# Patient Record
Sex: FEMALE | Race: WHITE | NOT HISPANIC OR LATINO | Employment: OTHER | URBAN - NONMETROPOLITAN AREA
[De-identification: names, ages, dates, MRNs, and addresses within clinical notes are randomized per-mention and may not be internally consistent; named-entity substitution may affect disease eponyms.]

---

## 2017-01-12 ENCOUNTER — OFFICE VISIT (OUTPATIENT)
Dept: GASTROENTEROLOGY | Facility: CLINIC | Age: 27
End: 2017-01-12

## 2017-01-12 VITALS
SYSTOLIC BLOOD PRESSURE: 122 MMHG | HEIGHT: 66 IN | WEIGHT: 162.6 LBS | BODY MASS INDEX: 26.13 KG/M2 | HEART RATE: 86 BPM | DIASTOLIC BLOOD PRESSURE: 80 MMHG | RESPIRATION RATE: 20 BRPM

## 2017-01-12 DIAGNOSIS — K50.00 CROHN'S DISEASE OF SMALL INTESTINE WITHOUT COMPLICATION (HCC): Primary | ICD-10-CM

## 2017-01-12 PROCEDURE — 99212 OFFICE O/P EST SF 10 MIN: CPT | Performed by: CLINICAL NURSE SPECIALIST

## 2017-01-12 RX ORDER — SODIUM, POTASSIUM,MAG SULFATES 17.5-3.13G
SOLUTION, RECONSTITUTED, ORAL ORAL
Qty: 2 BOTTLE | Refills: 0 | Status: SHIPPED | OUTPATIENT
Start: 2017-01-12 | End: 2017-03-28 | Stop reason: HOSPADM

## 2017-01-12 NOTE — LETTER
January 12, 2017     Rafi Barry MD  [address]  [facility] [address]    Patient: Adrianne Mclaughlin   YOB: 1990   Date of Visit: 1/12/2017       Dear Dr. Jhonny MD:    Thank you for referring Adrianne Mclaughlin to me for evaluation. Below are the relevant portions of my assessment and plan of care.    If you have questions, please do not hesitate to call me. I look forward to following Adrianne along with you.         Sincerely,        JENNIFER Lezama        CC: No Recipients  JENNIFER Lezama  1/12/2017  9:36 AM  Signed  Chief Complaint   Patient presents with   • Follow-up     crohns     Subjective   HPI  Adrianne Mclaughlin is a 26 y.o. female who presents with a complaint of Crohns disease that is persistent ongoing chronic for her. Stable with Cimzia. No aggravating or alleviating factor.  She was first diagnosed in 2013 and bx TI showed moderate active inflammation at that time. Next colonoscopy was in 2014 erythematous furrowing granular inflamed and ulcerated CW Crohns.  She has a hx of fistulaizing disease. She does have a spot on her pubic region that does flare and will drain slightly then go away. It is not persistent. It comes and goes over the past year. Drains on occasion like a pimple.  No fever chills or sweats. No diarrhea. No bRBPr. No signs of Crohns flare.     Past Medical History   Diagnosis Date   • Crohn's disease      Past Surgical History   Procedure Laterality Date   • Colonoscopy  02/24/2014     erythematous furrowing granular inflamed and ulcerated mucosa in the TI    • Colonoscopy  01/10/2013     fragments of benight mucosa consistent with TI moderate active infllammation nonspecific     Outpatient Prescriptions Marked as Taking for the 1/12/17 encounter (Office Visit) with JENNIFER Lezama   Medication Sig Dispense Refill   • certolizumab pegol (CIMZIA PREFILLED) 2 X 200 MG/ML kit injection Inject 200 mg under the skin  Every 14 (Fourteen) Days.       No Known Allergies  Social History     Social History   • Marital status:      Spouse name: N/A   • Number of children: N/A   • Years of education: N/A     Occupational History   • Not on file.     Social History Main Topics   • Smoking status: Never Smoker   • Smokeless tobacco: Never Used   • Alcohol use Yes      Comment: occasional   • Drug use: No   • Sexual activity: Yes     Partners: Male     Birth control/ protection: Pill     Other Topics Concern   • Not on file     Social History Narrative     Family History   Problem Relation Age of Onset   • Diabetes Maternal Grandmother    • Heart disease Paternal Grandmother    • Diabetes Paternal Grandfather      Health Maintenance   Topic Date Due   • INFLUENZA VACCINE  08/01/2016     Review of Systems   Constitutional: Negative for activity change, appetite change, chills, diaphoresis, fatigue, fever and unexpected weight change.   HENT: Negative for ear pain, hearing loss, mouth sores, sore throat, trouble swallowing and voice change.    Eyes: Negative.    Respiratory: Negative for cough, choking, shortness of breath and wheezing.    Cardiovascular: Negative for chest pain and palpitations.   Gastrointestinal: Negative for abdominal pain, blood in stool, constipation, diarrhea, nausea and vomiting.        Crohns   Endocrine: Negative for cold intolerance and heat intolerance.   Genitourinary: Negative for decreased urine volume, dysuria, frequency, hematuria and urgency.        Pelvic sore healing   Musculoskeletal: Negative for back pain, gait problem and myalgias.   Skin: Negative for color change, pallor and rash.   Allergic/Immunologic: Negative for food allergies and immunocompromised state.   Neurological: Negative for dizziness, tremors, seizures, syncope, weakness, light-headedness, numbness and headaches.   Hematological: Negative for adenopathy. Does not bruise/bleed easily.   Psychiatric/Behavioral: Negative for  "agitation and confusion. The patient is not nervous/anxious.    All other systems reviewed and are negative.    Objective   Vitals:    01/12/17 0909   BP: 122/80   BP Location: Right arm   Patient Position: Sitting   Cuff Size: Adult   Pulse: 86   Resp: 20   Weight: 162 lb 9.6 oz (73.8 kg)   Height: 66\" (167.6 cm)     Body mass index is 26.24 kg/(m^2).  Physical Exam   Constitutional: She is oriented to person, place, and time. She appears well-developed and well-nourished.   HENT:   Head: Normocephalic and atraumatic.   Eyes: Pupils are equal, round, and reactive to light.   Neck: Normal range of motion. Neck supple. No tracheal deviation present.   Cardiovascular: Normal rate, regular rhythm and normal heart sounds.  Exam reveals no gallop and no friction rub.    No murmur heard.  Pulmonary/Chest: Effort normal and breath sounds normal. No respiratory distress. She has no wheezes. She has no rales. She exhibits no tenderness.   Abdominal: Soft. Bowel sounds are normal. She exhibits no distension. There is no hepatosplenomegaly. There is no tenderness. There is no rigidity, no rebound and no guarding.   Musculoskeletal: Normal range of motion. She exhibits no edema, tenderness or deformity.   Neurological: She is alert and oriented to person, place, and time. She has normal reflexes.   Skin: Skin is warm and dry. No rash noted. No pallor.   Sore in the pelvic region in the pubic hair no drainage does not look like a fistula today. Healing, pink in color.    Psychiatric: She has a normal mood and affect. Her behavior is normal. Judgment and thought content normal.     Assessment/Plan   Adrianne was seen today for follow-up.    Diagnoses and all orders for this visit:    Crohn's disease of small intestine without complication  -     Case Request; Standing  -     Implement Anesthesia Orders Day of Procedure; Standing  -     Obtain Informed Consent; Standing  -     Verify Informed Consent; Standing  -     Verify bowel " prep was successful; Standing  -     Case Request  -     SUPREP BOWEL PREP solution oral solution; Take as directed by office instructions provided    Pelvic region sore looks like ingrown hair I told her if this persists or starts to drain to come in and let us evaluate it.   Continue Cimzia  COLONOSCOPY WITH ANESTHESIA (N/A)  EMR Dragon/transcription disclaimer: Much of this encounter note is electronic transcription/translation of spoken language to printed text. The electronic translation of spoken language may be erroneous, or at times, nonsensical words or phrases may be inadvertently transcribed. Although I have reviewed the note for such errors, some may still exist.  Body mass index is 26.24 kg/(m^2).  Return in about 6 months (around 7/12/2017) for with Jackeline.

## 2017-01-12 NOTE — MR AVS SNAPSHOT
Adrianne Rennercasandra   1/12/2017 9:00 AM   Office Visit    Dept Phone:  [phone]   Encounter #:  [id number]    Provider:  JENNIFER Lezaam   Department:  [facility]                Your Full Care Plan              Today's Medication Changes          These changes are accurate as of: 1/12/17  9:46 AM.  If you have any questions, ask your nurse or doctor.               New Medication(s)Ordered:     SUPREP BOWEL PREP solution oral solution   Generic drug:  sodium-potassium-magnesium sulfates   Take as directed by office instructions provided   Started by:  JENNIFER Lezama         Medication(s)that have changed:     CIMZIA PREFILLED 2 X 200 MG/ML kit injection   Generic drug:  certolizumab pegol   Inject 200 mg under the skin Every 14 (Fourteen) Days.   What changed:  Another medication with the same name was removed. Continue taking this medication, and follow the directions you see here.   Changed by:  JENNIFER Lezama            Where to Get Your Medications      These medications were sent to [facility] [address] [place] [address] [address] AT [facility] [address] - [phone]  - [phone]   [address], [facility] [address]     Phone:  [phone]     SUPREP BOWEL PREP solution oral solution                  Your Updated Medication List          This list is accurate as of: 1/12/17  9:46 AM.  Always use your most recent med list.                CIMZIA PREFILLED 2 X 200 MG/ML kit injection   Generic drug:  certolizumab pegol       FLUVIRIN suspension injection   Generic drug:  Influenza Vac Typ A&B Surf Ant       SUPREP BOWEL PREP solution oral solution   Generic drug:  sodium-potassium-magnesium sulfates   Take as directed by office instructions provided               We Performed the Following     Case Request       You Were Diagnosed With        Codes Comments    Crohn's disease of  "small intestine without complication    -  Primary ICD-10-CM: K50.00  ICD-9-CM: 555.0       Instructions     None    Patient Instructions History      Upcoming Appointments     Visit Type Date Time Department    OFFICE VISIT 2017  9:00 AM [facility]    FOLLOW UP 2017  3:00 PM [facility]      MyChart Signup     [facility] [employer] allows you to send messages to your doctor, view your test results, renew your prescriptions, schedule appointments, and more. To sign up, go to [employer] and click on the Sign Up Now link in the New User? box. Enter your [employer] Activation Code exactly as it appears below along with the last four digits of your Social Security Number and your Date of Birth () to complete the sign-up process. If you do not sign up before the expiration date, you must request a new code.    [employer] Activation Code: [email or web address]  Expires: 2017  9:43 AM    If you have questions, you can email [email or web address] or call [phone] to talk to our [employer] staff. Remember, [employer] is NOT to be used for urgent needs. For medical emergencies, dial 911.               Other Info from Your Visit           Your Appointments     2017  3:00 PM CDT   Follow Up with Venancio Viveros MD   [facility] (--)    [address] [place] [id number] [phone]           Arrive 15 minutes prior to appointment.              Allergies     No Known Allergies      Reason for Visit     Follow-up crohns      Vital Signs     Blood Pressure Pulse Respirations Height Weight Body Mass Index    122/80 (BP Location: Right arm, Patient Position: Sitting, Cuff Size: Adult) 86 20 66\" (167.6 cm) 162 lb 9.6 oz (73.8 kg) 26.24 kg/m2    Smoking Status                   Never Smoker           Problems and Diagnoses Noted     Inflammatory bowel disease (Crohn's disease)        "

## 2017-01-12 NOTE — PROGRESS NOTES
Chief Complaint   Patient presents with   • Follow-up     crohns     Subjective   HPI  Adrianne Mclaughlin is a 26 y.o. female who presents with a complaint of Crohns disease that is persistent ongoing chronic for her. Stable with Cimzia. No aggravating or alleviating factor.  She was first diagnosed in 2013 and bx TI showed moderate active inflammation at that time. Next colonoscopy was in 2014 erythematous furrowing granular inflamed and ulcerated CW Crohns.  She has a hx of fistulaizing disease. She does have a spot on her pubic region that does flare and will drain slightly then go away. It is not persistent. It comes and goes over the past year. Drains on occasion like a pimple.  No fever chills or sweats. No diarrhea. No bRBPr. No signs of Crohns flare.     Past Medical History   Diagnosis Date   • Crohn's disease      Past Surgical History   Procedure Laterality Date   • Colonoscopy  02/24/2014     erythematous furrowing granular inflamed and ulcerated mucosa in the TI    • Colonoscopy  01/10/2013     fragments of benight mucosa consistent with TI moderate active infllammation nonspecific     Outpatient Prescriptions Marked as Taking for the 1/12/17 encounter (Office Visit) with JENNIFER Lezama   Medication Sig Dispense Refill   • certolizumab pegol (CIMZIA PREFILLED) 2 X 200 MG/ML kit injection Inject 200 mg under the skin Every 14 (Fourteen) Days.       No Known Allergies  Social History     Social History   • Marital status:      Spouse name: N/A   • Number of children: N/A   • Years of education: N/A     Occupational History   • Not on file.     Social History Main Topics   • Smoking status: Never Smoker   • Smokeless tobacco: Never Used   • Alcohol use Yes      Comment: occasional   • Drug use: No   • Sexual activity: Yes     Partners: Male     Birth control/ protection: Pill     Other Topics Concern   • Not on file     Social History Narrative     Family History   Problem Relation Age  "of Onset   • Diabetes Maternal Grandmother    • Heart disease Paternal Grandmother    • Diabetes Paternal Grandfather      Health Maintenance   Topic Date Due   • INFLUENZA VACCINE  08/01/2016     Review of Systems   Constitutional: Negative for activity change, appetite change, chills, diaphoresis, fatigue, fever and unexpected weight change.   HENT: Negative for ear pain, hearing loss, mouth sores, sore throat, trouble swallowing and voice change.    Eyes: Negative.    Respiratory: Negative for cough, choking, shortness of breath and wheezing.    Cardiovascular: Negative for chest pain and palpitations.   Gastrointestinal: Negative for abdominal pain, blood in stool, constipation, diarrhea, nausea and vomiting.        Crohns   Endocrine: Negative for cold intolerance and heat intolerance.   Genitourinary: Negative for decreased urine volume, dysuria, frequency, hematuria and urgency.        Pelvic sore healing   Musculoskeletal: Negative for back pain, gait problem and myalgias.   Skin: Negative for color change, pallor and rash.   Allergic/Immunologic: Negative for food allergies and immunocompromised state.   Neurological: Negative for dizziness, tremors, seizures, syncope, weakness, light-headedness, numbness and headaches.   Hematological: Negative for adenopathy. Does not bruise/bleed easily.   Psychiatric/Behavioral: Negative for agitation and confusion. The patient is not nervous/anxious.    All other systems reviewed and are negative.    Objective   Vitals:    01/12/17 0909   BP: 122/80   BP Location: Right arm   Patient Position: Sitting   Cuff Size: Adult   Pulse: 86   Resp: 20   Weight: 162 lb 9.6 oz (73.8 kg)   Height: 66\" (167.6 cm)     Body mass index is 26.24 kg/(m^2).  Physical Exam   Constitutional: She is oriented to person, place, and time. She appears well-developed and well-nourished.   HENT:   Head: Normocephalic and atraumatic.   Eyes: Pupils are equal, round, and reactive to light.   Neck: " Normal range of motion. Neck supple. No tracheal deviation present.   Cardiovascular: Normal rate, regular rhythm and normal heart sounds.  Exam reveals no gallop and no friction rub.    No murmur heard.  Pulmonary/Chest: Effort normal and breath sounds normal. No respiratory distress. She has no wheezes. She has no rales. She exhibits no tenderness.   Abdominal: Soft. Bowel sounds are normal. She exhibits no distension. There is no hepatosplenomegaly. There is no tenderness. There is no rigidity, no rebound and no guarding.   Musculoskeletal: Normal range of motion. She exhibits no edema, tenderness or deformity.   Neurological: She is alert and oriented to person, place, and time. She has normal reflexes.   Skin: Skin is warm and dry. No rash noted. No pallor.   Sore in the pelvic region in the pubic hair no drainage does not look like a fistula today. Healing, pink in color.    Psychiatric: She has a normal mood and affect. Her behavior is normal. Judgment and thought content normal.     Assessment/Plan   Adrianne was seen today for follow-up.    Diagnoses and all orders for this visit:    Crohn's disease of small intestine without complication  -     Case Request; Standing  -     Implement Anesthesia Orders Day of Procedure; Standing  -     Obtain Informed Consent; Standing  -     Verify Informed Consent; Standing  -     Verify bowel prep was successful; Standing  -     Case Request  -     SUPREP BOWEL PREP solution oral solution; Take as directed by office instructions provided    Pelvic region sore looks like ingrown hair I told her if this persists or starts to drain to come in and let us evaluate it.   Continue Cimzia  COLONOSCOPY WITH ANESTHESIA (N/A)  EMR Dragon/transcription disclaimer: Much of this encounter note is electronic transcription/translation of spoken language to printed text. The electronic translation of spoken language may be erroneous, or at times, nonsensical words or phrases may be  inadvertently transcribed. Although I have reviewed the note for such errors, some may still exist.  Body mass index is 26.24 kg/(m^2).  Return in about 6 months (around 7/12/2017) for with Jackeline.

## 2017-02-16 DIAGNOSIS — K50.919 CROHN'S DISEASE WITH COMPLICATION, UNSPECIFIED GASTROINTESTINAL TRACT LOCATION (HCC): Primary | ICD-10-CM

## 2017-03-24 ENCOUNTER — ANESTHESIA EVENT (OUTPATIENT)
Dept: GASTROENTEROLOGY | Facility: HOSPITAL | Age: 27
End: 2017-03-24

## 2017-03-28 ENCOUNTER — HOSPITAL ENCOUNTER (OUTPATIENT)
Facility: HOSPITAL | Age: 27
Setting detail: HOSPITAL OUTPATIENT SURGERY
Discharge: HOME | End: 2017-03-28
Attending: INTERNAL MEDICINE | Admitting: INTERNAL MEDICINE

## 2017-03-28 ENCOUNTER — ANESTHESIA (OUTPATIENT)
Dept: GASTROENTEROLOGY | Facility: HOSPITAL | Age: 27
End: 2017-03-28

## 2017-03-28 VITALS
RESPIRATION RATE: 22 BRPM | HEART RATE: 60 BPM | WEIGHT: 154 LBS | DIASTOLIC BLOOD PRESSURE: 67 MMHG | HEIGHT: 66 IN | TEMPERATURE: 97.3 F | SYSTOLIC BLOOD PRESSURE: 112 MMHG | OXYGEN SATURATION: 100 % | BODY MASS INDEX: 24.75 KG/M2

## 2017-03-28 DIAGNOSIS — K50.00 CROHN'S DISEASE OF SMALL INTESTINE WITHOUT COMPLICATION (HCC): ICD-10-CM

## 2017-03-28 LAB — B-HCG UR QL: NEGATIVE

## 2017-03-28 PROCEDURE — 45378 DIAGNOSTIC COLONOSCOPY: CPT | Performed by: INTERNAL MEDICINE

## 2017-03-28 PROCEDURE — 25010000002 PROPOFOL 10 MG/ML EMULSION: Performed by: NURSE ANESTHETIST, CERTIFIED REGISTERED

## 2017-03-28 PROCEDURE — 81025 URINE PREGNANCY TEST: CPT | Performed by: NURSE ANESTHETIST, CERTIFIED REGISTERED

## 2017-03-28 RX ORDER — PROPOFOL 10 MG/ML
VIAL (ML) INTRAVENOUS AS NEEDED
Status: DISCONTINUED | OUTPATIENT
Start: 2017-03-28 | End: 2017-03-28 | Stop reason: SURG

## 2017-03-28 RX ORDER — SODIUM CHLORIDE 0.9 % (FLUSH) 0.9 %
1-10 SYRINGE (ML) INJECTION AS NEEDED
Status: DISCONTINUED | OUTPATIENT
Start: 2017-03-28 | End: 2017-03-28 | Stop reason: HOSPADM

## 2017-03-28 RX ORDER — LIDOCAINE HYDROCHLORIDE 20 MG/ML
INJECTION, SOLUTION INFILTRATION; PERINEURAL AS NEEDED
Status: DISCONTINUED | OUTPATIENT
Start: 2017-03-28 | End: 2017-03-28 | Stop reason: SURG

## 2017-03-28 RX ORDER — SODIUM CHLORIDE 9 MG/ML
100 INJECTION, SOLUTION INTRAVENOUS CONTINUOUS
Status: DISCONTINUED | OUTPATIENT
Start: 2017-03-28 | End: 2017-03-28 | Stop reason: HOSPADM

## 2017-03-28 RX ADMIN — LIDOCAINE HYDROCHLORIDE 0.5 ML: 10 INJECTION, SOLUTION EPIDURAL; INFILTRATION; INTRACAUDAL; PERINEURAL at 08:00

## 2017-03-28 RX ADMIN — LIDOCAINE HYDROCHLORIDE 50 MG: 20 INJECTION, SOLUTION INFILTRATION; PERINEURAL at 08:50

## 2017-03-28 RX ADMIN — PROPOFOL 200 MG: 10 INJECTION, EMULSION INTRAVENOUS at 08:50

## 2017-03-28 RX ADMIN — SODIUM CHLORIDE 100 ML/HR: 9 INJECTION, SOLUTION INTRAVENOUS at 08:00

## 2017-03-28 NOTE — ANESTHESIA PREPROCEDURE EVALUATION
Anesthesia Evaluation     Patient summary reviewed   NPO Status: > 2 hours   Airway   Mallampati: I  TM distance: >3 FB  Neck ROM: full  no difficulty expected  Dental - normal exam     Pulmonary - negative pulmonary ROS and normal exam   Cardiovascular   Exercise tolerance: excellent (>7 METS)        Neuro/Psych- negative ROS  GI/Hepatic/Renal/Endo - negative ROS     Musculoskeletal (-) negative ROS    Abdominal  - normal exam   Substance History   (+) alcohol use,      OB/GYN negative ob/gyn ROS         Other - negative ROS           Phys Exam Other: Charline drink 0530                            Anesthesia Plan    ASA 1     general     intravenous induction   Anesthetic plan and risks discussed with patient.    Plan discussed with CRNA.

## 2017-03-28 NOTE — PLAN OF CARE
Problem: GI Endoscopy (Adult)  Goal: Signs and Symptoms of Listed Potential Problems Will be Absent or Manageable (GI Endoscopy)  Outcome: Outcome(s) achieved Date Met:  03/28/17

## 2017-03-28 NOTE — PLAN OF CARE
Problem: Patient Care Overview (Adult)  Goal: Plan of Care Review  Outcome: Ongoing (interventions implemented as appropriate)    03/28/17 0903   Coping/Psychosocial Response Interventions   Plan Of Care Reviewed With patient   Patient Care Overview   Progress no change   Outcome Evaluation   Outcome Summary/Follow up Plan pt toleraed procedure well.

## 2017-03-28 NOTE — H&P
"[facility]  Pre Procedure History & Physical    Chief Complaint:   Crohn's    Subjective     HPI:   Here for colonoscopy.  History of Crohn's.  Please see office note dated 1/12/2017    Past Medical History:   Past Medical History:   Diagnosis Date   • Crohn's disease        Past Surgical History:  [unfilled]    Family History:  Family History   Problem Relation Age of Onset   • Diabetes Maternal Grandmother    • Heart disease Paternal Grandmother    • Diabetes Paternal Grandfather        Social History:   reports that she has never smoked. She has never used smokeless tobacco. She reports that she drinks alcohol. She reports that she does not use illicit drugs.    Medications:   Prior to Admission medications    Medication Sig Start Date End Date Taking? Authorizing Provider   certolizumab pegol (CIMZIA PREFILLED) 2 X 200 MG/ML kit injection Inject 200 mg under the skin Every 14 (Fourteen) Days.   Yes Historical Provider, MD   SUPREP BOWEL PREP solution oral solution Take as directed by office instructions provided 1/12/17  Yes JENNIFER Lezama   FLUVIRIN suspension injection ADM 0.5ML IM UTD 10/18/16   Historical Provider, MD       Allergies:  Review of patient's allergies indicates no known allergies.    Objective     Blood pressure 131/69, pulse 70, temperature 97.3 °F (36.3 °C), temperature source Temporal Artery , resp. rate 20, height 66\" (167.6 cm), weight 154 lb (69.9 kg), last menstrual period 03/20/2017, SpO2 100 %.    Physical Exam   Constitutional: Pt is oriented to person, place, and in no distress.   HENT: Mouth/Throat: Oropharynx is clear.   Cardiovascular: Normal rate, regular rhythm.    Pulmonary/Chest: Effort normal. No respiratory distress. No  wheezes.   Abdominal: Soft. Non-distended.  Skin: Skin is warm and dry.   Psychiatric: Mood, memory, affect and judgment appear normal.     Assessment/Plan     Diagnosis:  Crohn's     Anticipated Surgical Procedure:    Proceed with " colonoscopy as scheduled    The risks, benefits, and alternatives of this procedure have been discussed with the patient or the responsible party- the patient understands and agrees to proceed.    EMR Dragon/transcription disclaimer: Much of this encounter note is an electronic transcription/translation of spoken language to printed text.  The electronic translation of spoken language may permit erroneous, or at times, nonsensical words or phrases to be inadvertently transcribed.  Although I have reviewed the note for such errors, some may still exist.    Venancio Viveros MD  8:51 AM  3/28/2017

## 2017-03-28 NOTE — PLAN OF CARE
Problem: Patient Care Overview (Adult)  Goal: Plan of Care Review  Outcome: Outcome(s) achieved Date Met:  03/28/17 03/28/17 0920   Coping/Psychosocial Response Interventions   Plan Of Care Reviewed With patient;spouse   Patient Care Overview   Progress improving   Outcome Evaluation   Outcome Summary/Follow up Plan D/C CRITERIA MET

## 2017-06-27 ENCOUNTER — APPOINTMENT (OUTPATIENT)
Dept: LAB | Facility: HOSPITAL | Age: 27
End: 2017-06-27
Attending: INTERNAL MEDICINE

## 2017-06-27 ENCOUNTER — OFFICE VISIT (OUTPATIENT)
Dept: GASTROENTEROLOGY | Facility: CLINIC | Age: 27
End: 2017-06-27

## 2017-06-27 VITALS
BODY MASS INDEX: 25.07 KG/M2 | SYSTOLIC BLOOD PRESSURE: 120 MMHG | DIASTOLIC BLOOD PRESSURE: 82 MMHG | OXYGEN SATURATION: 99 % | HEART RATE: 88 BPM | HEIGHT: 66 IN | WEIGHT: 156 LBS

## 2017-06-27 DIAGNOSIS — K50.00 CROHN'S DISEASE OF SMALL INTESTINE WITHOUT COMPLICATION (HCC): Primary | ICD-10-CM

## 2017-06-27 LAB
ALBUMIN SERPL-MCNC: 4.4 G/DL (ref 3.5–5)
ALBUMIN/GLOB SERPL: 1.3 G/DL (ref 1.1–2.5)
ALP SERPL-CCNC: 75 U/L (ref 24–120)
ALT SERPL W P-5'-P-CCNC: 29 U/L (ref 0–54)
ANION GAP SERPL CALCULATED.3IONS-SCNC: 10 MMOL/L (ref 4–13)
AST SERPL-CCNC: 21 U/L (ref 7–45)
BILIRUB SERPL-MCNC: 0.7 MG/DL (ref 0.1–1)
BUN BLD-MCNC: 11 MG/DL (ref 5–21)
BUN/CREAT SERPL: 13.1 (ref 7–25)
CALCIUM SPEC-SCNC: 9.4 MG/DL (ref 8.4–10.4)
CHLORIDE SERPL-SCNC: 104 MMOL/L (ref 98–110)
CO2 SERPL-SCNC: 26 MMOL/L (ref 24–31)
CREAT BLD-MCNC: 0.84 MG/DL (ref 0.5–1.4)
CRP SERPL-MCNC: <0.5 MG/DL (ref 0–0.99)
DEPRECATED RDW RBC AUTO: 43.2 FL (ref 40–54)
ERYTHROCYTE [DISTWIDTH] IN BLOOD BY AUTOMATED COUNT: 13.6 % (ref 12–15)
GFR SERPL CREATININE-BSD FRML MDRD: 81 ML/MIN/1.73
GLOBULIN UR ELPH-MCNC: 3.3 GM/DL
GLUCOSE BLD-MCNC: 102 MG/DL (ref 70–100)
HCT VFR BLD AUTO: 40.4 % (ref 37–47)
HGB BLD-MCNC: 13.1 G/DL (ref 12–16)
MCH RBC QN AUTO: 28.4 PG (ref 28–32)
MCHC RBC AUTO-ENTMCNC: 32.4 G/DL (ref 33–36)
MCV RBC AUTO: 87.4 FL (ref 82–98)
PLATELET # BLD AUTO: 259 10*3/MM3 (ref 130–400)
PMV BLD AUTO: 12.1 FL (ref 6–12)
POTASSIUM BLD-SCNC: 4.3 MMOL/L (ref 3.5–5.3)
PROT SERPL-MCNC: 7.7 G/DL (ref 6.3–8.7)
RBC # BLD AUTO: 4.62 10*6/MM3 (ref 4.2–5.4)
SODIUM BLD-SCNC: 140 MMOL/L (ref 135–145)
WBC NRBC COR # BLD: 11.44 10*3/MM3 (ref 4.8–10.8)

## 2017-06-27 PROCEDURE — 86140 C-REACTIVE PROTEIN: CPT | Performed by: INTERNAL MEDICINE

## 2017-06-27 PROCEDURE — 36415 COLL VENOUS BLD VENIPUNCTURE: CPT | Performed by: INTERNAL MEDICINE

## 2017-06-27 PROCEDURE — 80053 COMPREHEN METABOLIC PANEL: CPT | Performed by: INTERNAL MEDICINE

## 2017-06-27 PROCEDURE — 85027 COMPLETE CBC AUTOMATED: CPT | Performed by: INTERNAL MEDICINE

## 2017-06-27 PROCEDURE — 99213 OFFICE O/P EST LOW 20 MIN: CPT | Performed by: INTERNAL MEDICINE

## 2017-06-27 NOTE — PROGRESS NOTES
[facility] Gastroenterology - Office note  6/27/2017    Adrianne Mclaughlin 1990     Chief Complaint   Patient presents with   • GI Problem     Crohns Disease       HISTORY OF PRESENT ILLNESS    Adrianne Mclaughlin is a 26 y.o. female who presents For follow-up of her Crohn's disease.  Actually been having some abdominal pain and distention.  No bleeding.  No fistula.  She was overdue one of her doses of Cimzia because of a miscommunication with the company.  Actually went 6 weeks instead of 4 weeks  between injections.    Last injection 1 week ago     Past Medical History:   Diagnosis Date   • Crohn's disease        Past Surgical History:   Procedure Laterality Date   • COLONOSCOPY  02/24/2014    erythematous furrowing granular inflamed and ulcerated mucosa in the TI    • COLONOSCOPY  01/10/2013    fragments of benight mucosa consistent with TI moderate active infllammation nonspecific   • COLONOSCOPY N/A 3/28/2017    Procedure: COLONOSCOPY WITH ANESTHESIA;  Surgeon: Venancio Viveros MD;  Location: [facility] ENDOSCOPY;  Service:          Current Outpatient Prescriptions:   •  certolizumab pegol (CIMZIA PREFILLED) 2 X 200 MG/ML kit injection, Inject 200 mg under the skin Every 14 (Fourteen) Days., Disp: , Rfl:   •  FLUVIRIN suspension injection, ADM 0.5ML IM UTD, Disp: , Rfl: 0    No Known Allergies    Social History     Social History   • Marital status:      Spouse name: N/A   • Number of children: N/A   • Years of education: N/A     Occupational History   • Not on file.     Social History Main Topics   • Smoking status: Never Smoker   • Smokeless tobacco: Never Used   • Alcohol use Yes      Comment: occasional   • Drug use: No   • Sexual activity: Yes     Partners: Male     Birth control/ protection: Pill     Other Topics Concern   • Not on file     Social History Narrative       Family History   Problem Relation Age of Onset   • Diabetes Maternal Grandmother    • Heart disease Paternal Grandmother   "  • Diabetes Paternal Grandfather    • Colon cancer Neg Hx    • Colon polyps Neg Hx        Review of Systems   Constitutional: Negative.    Respiratory: Negative.    Cardiovascular: Negative.    Gastrointestinal: Positive for abdominal distention, abdominal pain and diarrhea. Negative for blood in stool.       Vitals:    06/27/17 1512   BP: 120/82   Pulse: 88   SpO2: 99%   Weight: 156 lb (70.8 kg)   Height: 66\" (167.6 cm)    Body mass index is 25.18 kg/(m^2).    Physical Exam   Constitutional: She is oriented to person, place, and time. She appears well-developed and well-nourished.   Eyes: Pupils are equal, round, and reactive to light.   Cardiovascular: Normal rate.    Pulmonary/Chest: Effort normal.   Abdominal: Soft. Bowel sounds are normal. She exhibits no distension. There is tenderness. There is no rebound and no guarding.   Neurological: She is alert and oriented to person, place, and time.         ASSESSMENT AND PLAN      1. Crohn's disease of small intestine without complication  We will see how she does after getting back on Cimzia.  Check labs today.  See her back in 6 weeks.  Instructed to call if she has any ongoing problems.  Consider changing to Humira or even one of the injectables such as Entyvio.  Consider CT enterography as well  - CBC (No Diff)  - Comprehensive Metabolic Panel  - C-reactive Protein     EMR Dragon/transcription disclaimer: Much of this encounter note is an electronic transcription/translation of spoken language to printed text.  The electronic translation of spoken language may permit erroneous, or at times, nonsensical words or phrases to be inadvertently transcribed.  Although I have reviewed the note for such errors, some may still exist.    Venancio Viveros MD  6/27/2017  3:27 PM  "

## 2017-08-28 ENCOUNTER — OFFICE VISIT (OUTPATIENT)
Dept: GASTROENTEROLOGY | Facility: CLINIC | Age: 27
End: 2017-08-28

## 2017-08-28 VITALS
WEIGHT: 159 LBS | HEIGHT: 66 IN | BODY MASS INDEX: 25.55 KG/M2 | OXYGEN SATURATION: 100 % | SYSTOLIC BLOOD PRESSURE: 122 MMHG | DIASTOLIC BLOOD PRESSURE: 80 MMHG | HEART RATE: 82 BPM

## 2017-08-28 DIAGNOSIS — K50.00 CROHN'S DISEASE OF SMALL INTESTINE WITHOUT COMPLICATION (HCC): Primary | ICD-10-CM

## 2017-08-28 PROCEDURE — 99212 OFFICE O/P EST SF 10 MIN: CPT | Performed by: INTERNAL MEDICINE

## 2017-08-28 NOTE — PROGRESS NOTES
[facility] - Office note  8/28/2017    Adrianne Mclaughlin 1990     Chief Complaint   Patient presents with   • GI Problem     Here to discuss Crohns Disease       HISTORY OF PRESENT ILLNESS    Adrianne Mclaughlin is a 27 y.o. female who presents For follow-up.  She is back on her Cimzia and seems to be doing better.  Her symptoms have resolved.  No pain.  No bleeding.  No diarrhea.     Past Medical History:   Diagnosis Date   • Crohn's disease        Past Surgical History:   Procedure Laterality Date   • COLONOSCOPY  02/24/2014    erythematous furrowing granular inflamed and ulcerated mucosa in the TI    • COLONOSCOPY  01/10/2013    fragments of benight mucosa consistent with TI moderate active infllammation nonspecific   • COLONOSCOPY N/A 3/28/2017    Procedure: COLONOSCOPY WITH ANESTHESIA;  Surgeon: Veanncio Viveros MD;  Location: [facility] ENDOSCOPY;  Service:          Current Outpatient Prescriptions:   •  certolizumab pegol (CIMZIA PREFILLED) 2 X 200 MG/ML kit injection, Inject 200 mg under the skin Every 14 (Fourteen) Days., Disp: , Rfl:   •  FLUVIRIN suspension injection, ADM 0.5ML IM UTD, Disp: , Rfl: 0    No Known Allergies    Social History     Social History   • Marital status:      Spouse name: N/A   • Number of children: N/A   • Years of education: N/A     Occupational History   • Not on file.     Social History Main Topics   • Smoking status: Never Smoker   • Smokeless tobacco: Never Used   • Alcohol use Yes      Comment: occasional   • Drug use: No   • Sexual activity: Yes     Partners: Male     Birth control/ protection: Pill     Other Topics Concern   • Not on file     Social History Narrative       Family History   Problem Relation Age of Onset   • Diabetes Maternal Grandmother    • Heart disease Paternal Grandmother    • Diabetes Paternal Grandfather    • Colon cancer Neg Hx    • Colon polyps Neg Hx        Review of Systems    Vitals:    08/28/17 1512   BP: 122/80  "  Pulse: 82   SpO2: 100%   Weight: 159 lb (72.1 kg)   Height: 66\" (167.6 cm)    Body mass index is 25.66 kg/(m^2).    Physical Exam      Lab Results - Last 18 Months  Lab Units 06/27/17  1546   WBC 10*3/mm3 11.44*   HEMOGLOBIN g/dL 13.1   HEMATOCRIT % 40.4   MCV fL 87.4   PLATELETS 10*3/mm3 259   GLUCOSE mg/dL 102*   BUN mg/dL 11   CREATININE mg/dL 0.84   SODIUM mmol/L 140   POTASSIUM mmol/L 4.3   CHLORIDE mmol/L 104   CO2 mmol/L 26.0   TOTAL PROTEIN g/dL 7.7   ALBUMIN g/dL 4.40   ALT (SGPT) U/L 29   AST (SGOT) U/L 21   ALK PHOS U/L 75   BILIRUBIN mg/dL 0.7   GLOBULIN gm/dL 3.3   CRP mg/dL <0.50         ASSESSMENT AND PLAN      1. Crohn's disease of small intestine without complication  Symptoms improved on Cimzia.  No need to change her therapy at this time.  We will see her back in 4 months.    EMR Dragon/transcription disclaimer: Much of this encounter note is an electronic transcription/translation of spoken language to printed text.  The electronic translation of spoken language may permit erroneous, or at times, nonsensical words or phrases to be inadvertently transcribed.  Although I have reviewed the note for such errors, some may still exist.    Venancio Viveros MD  8/28/2017  3:31 PM  "

## 2017-11-17 ENCOUNTER — OFFICE VISIT (OUTPATIENT)
Dept: RETAIL CLINIC | Facility: CLINIC | Age: 27
End: 2017-11-17

## 2017-11-17 DIAGNOSIS — Z23 FLU VACCINE NEED: Primary | ICD-10-CM

## 2017-11-18 NOTE — PROGRESS NOTES
Patient presented requesting flu vaccine which was administered per protocol. See [employer] forms.

## 2017-12-19 ENCOUNTER — OFFICE VISIT (OUTPATIENT)
Dept: GASTROENTEROLOGY | Facility: CLINIC | Age: 27
End: 2017-12-19

## 2017-12-19 VITALS
DIASTOLIC BLOOD PRESSURE: 80 MMHG | WEIGHT: 156 LBS | OXYGEN SATURATION: 98 % | BODY MASS INDEX: 25.07 KG/M2 | HEART RATE: 103 BPM | SYSTOLIC BLOOD PRESSURE: 122 MMHG | HEIGHT: 66 IN

## 2017-12-19 DIAGNOSIS — K50.00 CROHN'S DISEASE OF SMALL INTESTINE WITHOUT COMPLICATION (HCC): Primary | ICD-10-CM

## 2017-12-19 PROCEDURE — 99212 OFFICE O/P EST SF 10 MIN: CPT | Performed by: INTERNAL MEDICINE

## 2017-12-19 NOTE — PROGRESS NOTES
[facility] Gastroenterology - Office note  12/19/2017    Adrianne Mclaughlin 1990     Chief Complaint   Patient presents with   • GI Problem     Crohns Disease       HISTORY OF PRESENT ILLNESS    Adrianne Mclaughlin is a 27 y.o. female who presents For follow-up.  Crohn's is stable.  Cimzia has worked well for her.  No complaints of any fistula or perianal disease.  Plans to go to [place] on admission trip in July.  Should not be a problem as long as she is stable.    Past Medical History:   Diagnosis Date   • Crohn's disease        Past Surgical History:   Procedure Laterality Date   • COLONOSCOPY  02/24/2014    erythematous furrowing granular inflamed and ulcerated mucosa in the TI    • COLONOSCOPY  01/10/2013    fragments of benight mucosa consistent with TI moderate active infllammation nonspecific   • COLONOSCOPY N/A 3/28/2017    Procedure: COLONOSCOPY WITH ANESTHESIA;  Surgeon: Venancio Viveros MD;  Location: [facility] ENDOSCOPY;  Service:          Current Outpatient Prescriptions:   •  certolizumab pegol (CIMZIA PREFILLED) 2 X 200 MG/ML kit injection, Inject 200 mg under the skin Every 14 (Fourteen) Days., Disp: , Rfl:     No Known Allergies    Social History     Social History   • Marital status:      Spouse name: N/A   • Number of children: N/A   • Years of education: N/A     Occupational History   • Not on file.     Social History Main Topics   • Smoking status: Never Smoker   • Smokeless tobacco: Never Used   • Alcohol use Yes      Comment: occasional   • Drug use: No   • Sexual activity: Yes     Partners: Male     Birth control/ protection: Pill     Other Topics Concern   • Not on file     Social History Narrative       Family History   Problem Relation Age of Onset   • Diabetes Maternal Grandmother    • Heart disease Paternal Grandmother    • Diabetes Paternal Grandfather    • Colon cancer Neg Hx    • Colon polyps Neg Hx        Review of Systems   Respiratory: Negative.   "  Cardiovascular: Negative.    Gastrointestinal: Negative.        Vitals:    12/19/17 1445   BP: 122/80   Pulse: 103   SpO2: 98%   Weight: 70.8 kg (156 lb)   Height: 167.6 cm (66\")    Body mass index is 25.18 kg/(m^2).    Physical Exam   Constitutional: She appears well-developed and well-nourished.   Abdominal: Soft. Bowel sounds are normal.   Neurological: She is alert.   Skin: Skin is warm and dry.         Lab Results - Last 18 Months  Lab Units 06/27/17  1546   WBC 10*3/mm3 11.44*   HEMOGLOBIN g/dL 13.1   HEMATOCRIT % 40.4   MCV fL 87.4   PLATELETS 10*3/mm3 259   GLUCOSE mg/dL 102*   BUN mg/dL 11   CREATININE mg/dL 0.84   SODIUM mmol/L 140   POTASSIUM mmol/L 4.3   CHLORIDE mmol/L 104   CO2 mmol/L 26.0   TOTAL PROTEIN g/dL 7.7   ALBUMIN g/dL 4.40   ALT (SGPT) U/L 29   AST (SGOT) U/L 21   ALK PHOS U/L 75   BILIRUBIN mg/dL 0.7   GLOBULIN gm/dL 3.3   CRP mg/dL <0.50         ASSESSMENT AND PLAN      1. Crohn's disease of small intestine without complication  We will check labs today and see her back in 6 months before her mission trip    EMR Dragon/transcription disclaimer: Much of this encounter note is an electronic transcription/translation of spoken language to printed text.  The electronic translation of spoken language may permit erroneous, or at times, nonsensical words or phrases to be inadvertently transcribed.  Although I have reviewed the note for such errors, some may still exist.    Venancio Viveros MD  12/19/2017  3:17 PM  "

## 2017-12-28 ENCOUNTER — TELEPHONE (OUTPATIENT)
Dept: GASTROENTEROLOGY | Facility: CLINIC | Age: 27
End: 2017-12-28

## 2018-01-05 ENCOUNTER — TELEPHONE (OUTPATIENT)
Dept: GASTROENTEROLOGY | Facility: CLINIC | Age: 28
End: 2018-01-05

## 2018-01-19 ENCOUNTER — TRANSCRIBE ORDERS (OUTPATIENT)
Dept: ADMINISTRATIVE | Facility: HOSPITAL | Age: 28
End: 2018-01-19

## 2018-01-19 ENCOUNTER — APPOINTMENT (OUTPATIENT)
Dept: LAB | Facility: HOSPITAL | Age: 28
End: 2018-01-19
Attending: INTERNAL MEDICINE

## 2018-01-19 DIAGNOSIS — K50.00 CROHN'S DISEASE OF SMALL INTESTINE WITHOUT COMPLICATION (HCC): Primary | ICD-10-CM

## 2018-01-19 LAB
ALBUMIN SERPL-MCNC: 4.3 G/DL (ref 3.5–5)
ALBUMIN/GLOB SERPL: 1.4 G/DL (ref 1.1–2.5)
ALP SERPL-CCNC: 67 U/L (ref 24–120)
ALT SERPL W P-5'-P-CCNC: 22 U/L (ref 0–54)
ANION GAP SERPL CALCULATED.3IONS-SCNC: 12 MMOL/L (ref 4–13)
AST SERPL-CCNC: 24 U/L (ref 7–45)
BASOPHILS # BLD AUTO: 0.05 10*3/MM3 (ref 0–0.2)
BASOPHILS NFR BLD AUTO: 0.5 % (ref 0–2)
BILIRUB SERPL-MCNC: 0.7 MG/DL (ref 0.1–1)
BUN BLD-MCNC: 11 MG/DL (ref 5–21)
BUN/CREAT SERPL: 13.8 (ref 7–25)
CALCIUM SPEC-SCNC: 9.6 MG/DL (ref 8.4–10.4)
CHLORIDE SERPL-SCNC: 102 MMOL/L (ref 98–110)
CO2 SERPL-SCNC: 29 MMOL/L (ref 24–31)
CREAT BLD-MCNC: 0.8 MG/DL (ref 0.5–1.4)
DEPRECATED RDW RBC AUTO: 43.4 FL (ref 40–54)
EOSINOPHIL # BLD AUTO: 0.13 10*3/MM3 (ref 0–0.7)
EOSINOPHIL NFR BLD AUTO: 1.2 % (ref 0–4)
ERYTHROCYTE [DISTWIDTH] IN BLOOD BY AUTOMATED COUNT: 13.3 % (ref 12–15)
GFR SERPL CREATININE-BSD FRML MDRD: 85 ML/MIN/1.73
GLOBULIN UR ELPH-MCNC: 3.1 GM/DL
GLUCOSE BLD-MCNC: 92 MG/DL (ref 70–100)
HCT VFR BLD AUTO: 39.8 % (ref 37–47)
HGB BLD-MCNC: 12.8 G/DL (ref 12–16)
IMM GRANULOCYTES # BLD: 0.02 10*3/MM3 (ref 0–0.03)
IMM GRANULOCYTES NFR BLD: 0.2 % (ref 0–5)
LYMPHOCYTES # BLD AUTO: 3.03 10*3/MM3 (ref 0.72–4.86)
LYMPHOCYTES NFR BLD AUTO: 28.4 % (ref 15–45)
MCH RBC QN AUTO: 28.4 PG (ref 28–32)
MCHC RBC AUTO-ENTMCNC: 32.2 G/DL (ref 33–36)
MCV RBC AUTO: 88.2 FL (ref 82–98)
MONOCYTES # BLD AUTO: 0.81 10*3/MM3 (ref 0.19–1.3)
MONOCYTES NFR BLD AUTO: 7.6 % (ref 4–12)
NEUTROPHILS # BLD AUTO: 6.63 10*3/MM3 (ref 1.87–8.4)
NEUTROPHILS NFR BLD AUTO: 62.1 % (ref 39–78)
NRBC BLD MANUAL-RTO: 0 /100 WBC (ref 0–0)
PLATELET # BLD AUTO: 282 10*3/MM3 (ref 130–400)
PMV BLD AUTO: 11.7 FL (ref 6–12)
POTASSIUM BLD-SCNC: 3.9 MMOL/L (ref 3.5–5.3)
PROT SERPL-MCNC: 7.4 G/DL (ref 6.3–8.7)
RBC # BLD AUTO: 4.51 10*6/MM3 (ref 4.2–5.4)
SODIUM BLD-SCNC: 143 MMOL/L (ref 135–145)
WBC NRBC COR # BLD: 10.67 10*3/MM3 (ref 4.8–10.8)

## 2018-01-19 PROCEDURE — 80053 COMPREHEN METABOLIC PANEL: CPT | Performed by: INTERNAL MEDICINE

## 2018-01-19 PROCEDURE — 85025 COMPLETE CBC W/AUTO DIFF WBC: CPT | Performed by: INTERNAL MEDICINE

## 2018-01-19 PROCEDURE — 36415 COLL VENOUS BLD VENIPUNCTURE: CPT

## 2018-01-22 ENCOUNTER — OFFICE VISIT (OUTPATIENT)
Dept: RETAIL CLINIC | Facility: CLINIC | Age: 28
End: 2018-01-22

## 2018-01-22 VITALS
SYSTOLIC BLOOD PRESSURE: 98 MMHG | DIASTOLIC BLOOD PRESSURE: 62 MMHG | OXYGEN SATURATION: 98 % | HEART RATE: 78 BPM | TEMPERATURE: 99.5 F | RESPIRATION RATE: 20 BRPM

## 2018-01-22 DIAGNOSIS — J10.1 INFLUENZA A: Primary | ICD-10-CM

## 2018-01-22 LAB
EXPIRATION DATE: ABNORMAL
FLUAV AG NPH QL: POSITIVE
FLUBV AG NPH QL: NEGATIVE
INTERNAL CONTROL: ABNORMAL
Lab: ABNORMAL

## 2018-01-22 PROCEDURE — 87804 INFLUENZA ASSAY W/OPTIC: CPT | Performed by: NURSE PRACTITIONER

## 2018-01-22 PROCEDURE — 99213 OFFICE O/P EST LOW 20 MIN: CPT | Performed by: NURSE PRACTITIONER

## 2018-01-22 RX ORDER — OSELTAMIVIR PHOSPHATE 75 MG/1
75 CAPSULE ORAL
Qty: 10 CAPSULE | Refills: 0 | Status: SHIPPED | OUTPATIENT
Start: 2018-01-22 | End: 2018-01-27

## 2018-01-23 NOTE — PROGRESS NOTES
No chief complaint on file.    Subjective   Adrianne Mclaughlin is a 27 y.o. female who presents to the clinic today with complaints flu symptoms that started Saturday night. She has temperature of 101 yesterday, now low grade. She is take Lizett [place] cold and flu which has helped her HA and helps her sleep.   Influenza   This is a new problem. The current episode started in the past 7 days. The problem occurs constantly. The problem has been gradually worsening. Associated symptoms include arthralgias, chills, congestion, coughing, fatigue, a fever, headaches, myalgias, a sore throat and weakness. Pertinent negatives include no abdominal pain, anorexia, change in bowel habit, chest pain, diaphoresis, joint swelling, nausea, neck pain, numbness, rash, swollen glands, urinary symptoms, vertigo, visual change or vomiting. Nothing aggravates the symptoms. Treatments tried: lizett seltzer cold and flu. The treatment provided mild (helps HA. ) relief.         Current Outpatient Prescriptions:   •  certolizumab pegol (CIMZIA PREFILLED) 2 X 200 MG/ML kit injection, Inject 200 mg under the skin Every 14 (Fourteen) Days., Disp: , Rfl:   •  oseltamivir (TAMIFLU) 75 MG capsule, Take 1 capsule by mouth 2 (Two) Times a Day for 5 days., Disp: 10 capsule, Rfl: 0    Allergies:  Review of patient's allergies indicates no known allergies.    Past Medical History:   Diagnosis Date   • Crohn's disease      Past Surgical History:   Procedure Laterality Date   • COLONOSCOPY  02/24/2014    erythematous furrowing granular inflamed and ulcerated mucosa in the TI    • COLONOSCOPY  01/10/2013    fragments of benight mucosa consistent with TI moderate active infllammation nonspecific   • COLONOSCOPY N/A 3/28/2017    Procedure: COLONOSCOPY WITH ANESTHESIA;  Surgeon: Venancio Viveros MD;  Location: [facility] ENDOSCOPY;  Service:      Family History   Problem Relation Age of Onset   • Diabetes Maternal Grandmother    • Heart disease Paternal  Grandmother    • Diabetes Paternal Grandfather    • Colon cancer Neg Hx    • Colon polyps Neg Hx      Social History   Substance Use Topics   • Smoking status: Never Smoker   • Smokeless tobacco: Never Used   • Alcohol use Yes      Comment: occasional       Review of Systems  Review of Systems   Constitutional: Positive for chills, fatigue and fever. Negative for diaphoresis.   HENT: Positive for congestion and sore throat.    Respiratory: Positive for cough.    Cardiovascular: Negative for chest pain.   Gastrointestinal: Negative for abdominal pain, anorexia, change in bowel habit, nausea and vomiting.   Musculoskeletal: Positive for arthralgias and myalgias. Negative for joint swelling and neck pain.   Skin: Negative for rash.   Neurological: Positive for weakness and headaches. Negative for vertigo and numbness.       Objective   BP 98/62 (BP Location: Left arm, Patient Position: Sitting, Cuff Size: Adult)  Pulse 78  Temp 99.5 °F (37.5 °C) (Tympanic)   Resp 20  LMP 12/18/2017 (Approximate)  SpO2 98%  Breastfeeding? No      Physical Exam   Constitutional: She is oriented to person, place, and time. She appears well-developed and well-nourished.   HENT:   Head: Normocephalic and atraumatic.   Right Ear: Hearing, external ear and ear canal normal. Tympanic membrane is bulging.   Left Ear: Hearing, external ear and ear canal normal. Tympanic membrane is bulging.   Nose: Rhinorrhea present. Right sinus exhibits no maxillary sinus tenderness and no frontal sinus tenderness. Left sinus exhibits no maxillary sinus tenderness and no frontal sinus tenderness.   Mouth/Throat: Uvula is midline. Mucous membranes are dry. Posterior oropharyngeal erythema present. No oropharyngeal exudate, posterior oropharyngeal edema or tonsillar abscesses. Tonsils are 1+ on the right. Tonsils are 1+ on the left. No tonsillar exudate.   Eyes: Pupils are equal, round, and reactive to light.   Neck: Normal range of motion. Neck supple.    Cardiovascular: Normal rate, regular rhythm and normal heart sounds.  Exam reveals no gallop and no friction rub.    No murmur heard.  Pulmonary/Chest: Effort normal and breath sounds normal. No stridor. No respiratory distress. She has no wheezes. She has no rales. She exhibits no tenderness.   Lymphadenopathy:     She has no cervical adenopathy.   Neurological: She is alert and oriented to person, place, and time.   Skin: Skin is warm and dry.   Psychiatric: She has a normal mood and affect. Her behavior is normal.   Vitals reviewed.      Assessment/Plan     Diagnoses and all orders for this visit:    Influenza A  -     POCT Influenza A/B    Other orders  -     oseltamivir (TAMIFLU) 75 MG capsule; Take 1 capsule by mouth 2 (Two) Times a Day for 5 days.    Drink plenty of fluids and rest.   Follow up if symptoms worsen or persist.   Treat fever if over 101 and needs to keep fever < 101.

## 2018-01-23 NOTE — PATIENT INSTRUCTIONS

## 2018-01-30 ENCOUNTER — TELEPHONE (OUTPATIENT)
Dept: GASTROENTEROLOGY | Facility: CLINIC | Age: 28
End: 2018-01-30

## 2018-06-25 ENCOUNTER — HOSPITAL ENCOUNTER (OUTPATIENT)
Dept: GENERAL RADIOLOGY | Facility: HOSPITAL | Age: 28
Discharge: HOME | End: 2018-06-25
Admitting: CLINICAL NURSE SPECIALIST

## 2018-06-25 ENCOUNTER — OFFICE VISIT (OUTPATIENT)
Dept: GASTROENTEROLOGY | Facility: CLINIC | Age: 28
End: 2018-06-25

## 2018-06-25 ENCOUNTER — APPOINTMENT (OUTPATIENT)
Dept: LAB | Facility: HOSPITAL | Age: 28
End: 2018-06-25

## 2018-06-25 VITALS
SYSTOLIC BLOOD PRESSURE: 122 MMHG | HEIGHT: 66 IN | BODY MASS INDEX: 23.78 KG/M2 | HEART RATE: 77 BPM | DIASTOLIC BLOOD PRESSURE: 80 MMHG | WEIGHT: 148 LBS | OXYGEN SATURATION: 98 %

## 2018-06-25 DIAGNOSIS — K50.00 CROHN'S DISEASE OF SMALL INTESTINE WITHOUT COMPLICATION (HCC): ICD-10-CM

## 2018-06-25 DIAGNOSIS — K50.00 CROHN'S DISEASE OF SMALL INTESTINE WITHOUT COMPLICATION (HCC): Primary | ICD-10-CM

## 2018-06-25 PROCEDURE — 71046 X-RAY EXAM CHEST 2 VIEWS: CPT

## 2018-06-25 PROCEDURE — 99213 OFFICE O/P EST LOW 20 MIN: CPT | Performed by: INTERNAL MEDICINE

## 2018-06-25 NOTE — PROGRESS NOTES
Adrianne Mclaughlin  1990 6/25/2018  Chief Complaint   Patient presents with   • GI Problem     Crohns Disease         HPI    Adrianne Mclaughlin is a  27 y.o. female here for a follow up visit for complaint of Crohn's disease course constant ongoing. She is on Cimzia which has worked well for her. No complaints of signs of flare. No diarrhea. No BRBPR. No drainage or signs of pain. No signs of recurrent fistula. No upper GI complaints. No wt loss.     Past Medical History:   Diagnosis Date   • Crohn's disease      Past Surgical History:   Procedure Laterality Date   • COLONOSCOPY  02/24/2014    erythematous furrowing granular inflamed and ulcerated mucosa in the TI    • COLONOSCOPY  01/10/2013    fragments of benight mucosa consistent with TI moderate active infllammation nonspecific   • COLONOSCOPY N/A 3/28/2017    Stricture in the terminal ileum, Crohns disease with ileitis       Outpatient Prescriptions Marked as Taking for the 6/25/18 encounter (Office Visit) with Venancio Viveros MD   Medication Sig Dispense Refill   • certolizumab pegol (CIMZIA PREFILLED) 2 X 200 MG/ML kit injection Inject 200 mg under the skin Every 14 (Fourteen) Days.         No Known Allergies    Social History     Social History   • Marital status:      Spouse name: N/A   • Number of children: N/A   • Years of education: N/A     Occupational History   • Not on file.     Social History Main Topics   • Smoking status: Never Smoker   • Smokeless tobacco: Never Used   • Alcohol use Yes      Comment: occasional   • Drug use: No   • Sexual activity: Yes     Partners: Male     Birth control/ protection: Condom     Other Topics Concern   • Not on file     Social History Narrative   • No narrative on file       Family History   Problem Relation Age of Onset   • Diabetes Maternal Grandmother    • Heart disease Paternal Grandmother    • Diabetes Paternal Grandfather    • Colon cancer Neg Hx    • Colon polyps Neg Hx        Review of  "Systems   Constitutional: Negative for activity change, appetite change, chills, diaphoresis, fatigue, fever and unexpected weight change.   HENT: Negative for ear pain, hearing loss, mouth sores, sore throat, trouble swallowing and voice change.    Eyes: Negative.    Respiratory: Negative for cough, choking, shortness of breath and wheezing.    Cardiovascular: Negative for chest pain and palpitations.   Gastrointestinal: Negative for abdominal pain, blood in stool, constipation, diarrhea, nausea and vomiting.   Endocrine: Negative for cold intolerance and heat intolerance.   Genitourinary: Negative for decreased urine volume, dysuria, frequency, hematuria and urgency.   Musculoskeletal: Negative for back pain, gait problem and myalgias.   Skin: Negative for color change, pallor and rash.   Allergic/Immunologic: Negative for food allergies and immunocompromised state.   Neurological: Negative for dizziness, tremors, seizures, syncope, weakness, light-headedness, numbness and headaches.   Hematological: Negative for adenopathy. Does not bruise/bleed easily.   Psychiatric/Behavioral: Negative for agitation and confusion. The patient is not nervous/anxious.    All other systems reviewed and are negative.      /80   Pulse 77   Ht 167.6 cm (66\")   Wt 67.1 kg (148 lb)   SpO2 98%   BMI 23.89 kg/m²   Body mass index is 23.89 kg/m².    Patient's Body mass index is 23.89 kg/m². BMI is within normal parameters. No follow-up required.  Physical Exam   Constitutional: She is oriented to person, place, and time. She appears well-developed and well-nourished.   HENT:   Head: Normocephalic and atraumatic.   Eyes: Pupils are equal, round, and reactive to light.   Neck: Normal range of motion. Neck supple. No tracheal deviation present.   Cardiovascular: Normal rate, regular rhythm and normal heart sounds.  Exam reveals no gallop and no friction rub.    No murmur heard.  Pulmonary/Chest: Effort normal and breath sounds " normal. No respiratory distress. She has no wheezes. She has no rales. She exhibits no tenderness.   Abdominal: Soft. Bowel sounds are normal. She exhibits no distension. There is no hepatosplenomegaly. There is no tenderness. There is no rigidity, no rebound and no guarding.   Musculoskeletal: Normal range of motion. She exhibits no edema, tenderness or deformity.   Neurological: She is alert and oriented to person, place, and time. She has normal reflexes.   Skin: Skin is warm and dry. No rash noted. No pallor.   Psychiatric: She has a normal mood and affect. Her behavior is normal. Judgment and thought content normal.       ASSESSMENT AND PLAN    Adrianne was seen today for gi problem.    Diagnoses and all orders for this visit:    Crohn's disease of small intestine without complication  Comments:  cont ongoing active therapy  Orders:  -     QuantiFERON TB Gold  -     XR Chest 2 View; Future  -     Hepatitis B Surface Antibody  -     Hepatitis B Core Antibody, IgM      Due for colon next year. To continue ongoing active therapy she is stable at this time.     There are no Patient Instructions on file for this visit.  Emma Mireles, APRN  3:32 PM  6/25/2018    Obesity, Adult  Obesity is the condition of having too much total body fat. Being overweight or obese means that your weight is greater than what is considered healthy for your body size. Obesity is determined by a measurement called BMI. BMI is an estimate of body fat and is calculated from height and weight. For adults, a BMI of 30 or higher is considered obese.  Obesity can eventually lead to other health concerns and major illnesses, including:  · Stroke.  · Coronary artery disease (CAD).  · Type 2 diabetes.  · Some types of cancer, including cancers of the colon, breast, uterus, and gallbladder.  · Osteoarthritis.  · High blood pressure (hypertension).  · High cholesterol.  · Sleep apnea.  · Gallbladder stones.  · Infertility problems.  What are  the causes?  The main cause of obesity is taking in (consuming) more calories than your body uses for energy. Other factors that contribute to this condition may include:  · Being born with genes that make you more likely to become obese.  · Having a medical condition that causes obesity. These conditions include:  ¨ Hypothyroidism.  ¨ Polycystic ovarian syndrome (PCOS).  ¨ Binge-eating disorder.  ¨ Cushing syndrome.  · Taking certain medicines, such as steroids, antidepressants, and seizure medicines.  · Not being physically active (sedentary lifestyle).  · Living where there are limited places to exercise safely or buy healthy foods.  · Not getting enough sleep.  What increases the risk?  The following factors may increase your risk of this condition:  · Having a family history of obesity.  · Being a woman of -American descent.  · Being a man of  descent.  What are the signs or symptoms?  Having excessive body fat is the main symptom of this condition.  How is this diagnosed?  This condition may be diagnosed based on:  · Your symptoms.  · Your medical history.  · A physical exam. Your health care provider may measure:  ¨ Your BMI. If you are an adult with a BMI between 25 and less than 30, you are considered overweight. If you are an adult with a BMI of 30 or higher, you are considered obese.  ¨ The distances around your hips and your waist (circumferences). These may be compared to each other to help diagnose your condition.  ¨ Your skinfold thickness. Your health care provider may gently pinch a fold of your skin and measure it.  How is this treated?  Treatment for this condition often includes changing your lifestyle. Treatment may include some or all of the following:  · Dietary changes. Work with your health care provider and a dietitian to set a weight-loss goal that is healthy and reasonable for you. Dietary changes may include eating:  ¨ Smaller portions. A portion size is the amount of a  particular food that is healthy for you to eat at one time. This varies from person to person.  ¨ Low-calorie or low-fat options.  ¨ More whole grains, fruits, and vegetables.  · Regular physical activity. This may include aerobic activity (cardio) and strength training.  · Medicine to help you lose weight. Your health care provider may prescribe medicine if you are unable to lose 1 pound a week after 6 weeks of eating more healthily and doing more physical activity.  · Surgery. Surgical options may include gastric banding and gastric bypass. Surgery may be done if:  ¨ Other treatments have not helped to improve your condition.  ¨ You have a BMI of 40 or higher.  ¨ You have life-threatening health problems related to obesity.  Follow these instructions at home:     Eating and drinking     · Follow recommendations from your health care provider about what you eat and drink. Your health care provider may advise you to:  ¨ Limit fast foods, sweets, and processed snack foods.  ¨ Choose low-fat options, such as low-fat milk instead of whole milk.  ¨ Eat 5 or more servings of fruits or vegetables every day.  ¨ Eat at home more often. This gives you more control over what you eat.  ¨ Choose healthy foods when you eat out.  ¨ Learn what a healthy portion size is.  ¨ Keep low-fat snacks on hand.  ¨ Avoid sugary drinks, such as soda, fruit juice, iced tea sweetened with sugar, and flavored milk.  ¨ Eat a healthy breakfast.  · Drink enough water to keep your urine clear or pale yellow.  · Do not go without eating for long periods of time (do not fast) or follow a fad diet. Fasting and fad diets can be unhealthy and even dangerous.  Physical Activity   · Exercise regularly, as told by your health care provider. Ask your health care provider what types of exercise are safe for you and how often you should exercise.  · Warm up and stretch before being active.  · Cool down and stretch after being active.  · Rest between periods of  activity.  Lifestyle   · Limit the time that you spend in front of your TV, computer, or video game system.  · Find ways to reward yourself that do not involve food.  · Limit alcohol intake to no more than 1 drink a day for nonpregnant women and 2 drinks a day for men. One drink equals 12 oz of beer, 5 oz of wine, or 1½ oz of hard liquor.  General instructions   · Keep a weight loss journal to keep track of the food you eat and how much you exercise you get.  · Take over-the-counter and prescription medicines only as told by your health care provider.  · Take vitamins and supplements only as told by your health care provider.  · Consider joining a support group. Your health care provider may be able to recommend a support group.  · Keep all follow-up visits as told by your health care provider. This is important.  Contact a health care provider if:  · You are unable to meet your weight loss goal after 6 weeks of dietary and lifestyle changes.  This information is not intended to replace advice given to you by your health care provider. Make sure you discuss any questions you have with your health care provider.  Document Released: 01/25/2006 Document Revised: 05/22/2017 Document Reviewed: 10/05/2016  [employer] Interactive Patient Education © 2017 [employer]      IF YOU SMOKE OR USE TOBACCO PLEASE READ THE FOLLOWING:    Why is smoking bad for me?  Smoking increases the risk of heart disease, lung disease, vascular disease, stroke, and cancer.     If you smoke, STOP!    If you would like more information on quitting smoking, please visit the [employer] website: [email or web address]/healthier-[email or web address]   This link will provide additional resources including the QUIT line and the Beat the Pack support groups.     For more information:    Quit Now [place]  1-800-QUIT-NOW  https://[place].quitlogix.org/en-[place]/

## 2018-06-26 ENCOUNTER — LAB (OUTPATIENT)
Dept: LAB | Facility: HOSPITAL | Age: 28
End: 2018-06-26

## 2018-06-26 DIAGNOSIS — K50.00 CROHN'S DISEASE OF SMALL INTESTINE WITHOUT COMPLICATION (HCC): Primary | ICD-10-CM

## 2018-06-26 LAB
HBV CORE IGM SERPL QL IA: NEGATIVE
HBV SURFACE AB SER QL: 7.9
HBV SURFACE AB SER RIA-ACNC: NORMAL

## 2018-06-26 PROCEDURE — 36415 COLL VENOUS BLD VENIPUNCTURE: CPT

## 2018-06-26 PROCEDURE — 86706 HEP B SURFACE ANTIBODY: CPT | Performed by: CLINICAL NURSE SPECIALIST

## 2018-06-26 PROCEDURE — 86705 HEP B CORE ANTIBODY IGM: CPT | Performed by: CLINICAL NURSE SPECIALIST

## 2018-06-26 PROCEDURE — 86480 TB TEST CELL IMMUN MEASURE: CPT | Performed by: CLINICAL NURSE SPECIALIST

## 2018-07-03 LAB
INTERPRETATION: NORMAL
M TB TUBERC IFN-G BLD QL: NEGATIVE
QFT TB AG MINUS NIL VALUE: 0.01 IU/ML
QUANTIFERON CRITERIA: NORMAL
QUANTIFERON MITOGEN VALUE: 6.84 IU/ML
QUANTIFERON NIL VALUE: 0.04 IU/ML
QUANTIFERON TB AG VALUE: 0.05 IU/ML

## 2018-09-24 ENCOUNTER — OFFICE VISIT (OUTPATIENT)
Dept: GASTROENTEROLOGY | Facility: CLINIC | Age: 28
End: 2018-09-24

## 2018-09-24 VITALS
WEIGHT: 155 LBS | BODY MASS INDEX: 24.91 KG/M2 | DIASTOLIC BLOOD PRESSURE: 80 MMHG | HEIGHT: 66 IN | OXYGEN SATURATION: 99 % | SYSTOLIC BLOOD PRESSURE: 122 MMHG | HEART RATE: 105 BPM

## 2018-09-24 DIAGNOSIS — K50.00 CROHN'S DISEASE OF SMALL INTESTINE WITHOUT COMPLICATION (HCC): Primary | ICD-10-CM

## 2018-09-24 PROCEDURE — 99213 OFFICE O/P EST LOW 20 MIN: CPT | Performed by: INTERNAL MEDICINE

## 2018-09-24 NOTE — PROGRESS NOTES
Adrianne Mclaughlin  1990 9/24/2018  Chief Complaint   Patient presents with   • GI Problem     Crohns Disease         HPI    Adrianne Mclaughlin is a  28 y.o. female here for a follow up visit for Crohn's disease course constant onging. She has been on Cimzia and this worked well for her. The cost was a concern. She is switching to Humira on October 3rd. She has no signs of flare. No associated symptoms. No diarrhea or BRBPR.     Past Medical History:   Diagnosis Date   • Crohn's disease (CMS/HCC)      Past Surgical History:   Procedure Laterality Date   • COLONOSCOPY  02/24/2014    erythematous furrowing granular inflamed and ulcerated mucosa in the TI    • COLONOSCOPY  01/10/2013    fragments of benight mucosa consistent with TI moderate active infllammation nonspecific   • COLONOSCOPY N/A 3/28/2017    Stricture in the terminal ileum, Crohns disease with ileitis       No outpatient prescriptions have been marked as taking for the 9/24/18 encounter (Office Visit) with Venancio Viveros MD.       No Known Allergies    Social History     Social History   • Marital status:      Spouse name: N/A   • Number of children: N/A   • Years of education: N/A     Occupational History   • Not on file.     Social History Main Topics   • Smoking status: Never Smoker   • Smokeless tobacco: Never Used   • Alcohol use Yes      Comment: occasional   • Drug use: No   • Sexual activity: Yes     Partners: Male     Birth control/ protection: Condom     Other Topics Concern   • Not on file     Social History Narrative   • No narrative on file       Family History   Problem Relation Age of Onset   • Diabetes Maternal Grandmother    • Heart disease Paternal Grandmother    • Diabetes Paternal Grandfather    • Colon cancer Neg Hx    • Colon polyps Neg Hx        Review of Systems   Constitutional: Negative for activity change, appetite change, chills, diaphoresis, fatigue, fever and unexpected weight change.   HENT: Negative  "for ear pain, hearing loss, mouth sores, sore throat, trouble swallowing and voice change.    Eyes: Negative.    Respiratory: Negative for cough, choking, shortness of breath and wheezing.    Cardiovascular: Negative for chest pain and palpitations.   Gastrointestinal: Negative for abdominal pain, blood in stool, constipation, diarrhea, nausea and vomiting.   Endocrine: Negative for cold intolerance and heat intolerance.   Genitourinary: Negative for decreased urine volume, dysuria, frequency, hematuria and urgency.   Musculoskeletal: Negative for back pain, gait problem and myalgias.   Skin: Negative for color change, pallor and rash.   Allergic/Immunologic: Negative for food allergies and immunocompromised state.   Neurological: Negative for dizziness, tremors, seizures, syncope, weakness, light-headedness, numbness and headaches.   Hematological: Negative for adenopathy. Does not bruise/bleed easily.   Psychiatric/Behavioral: Negative for agitation and confusion. The patient is not nervous/anxious.    All other systems reviewed and are negative.      /80   Pulse 105   Ht 167.6 cm (66\")   Wt 70.3 kg (155 lb)   SpO2 99%   BMI 25.02 kg/m²   Body mass index is 25.02 kg/m².    Physical Exam   Constitutional: She is oriented to person, place, and time. She appears well-developed and well-nourished.   HENT:   Head: Normocephalic and atraumatic.   Eyes: Pupils are equal, round, and reactive to light.   Neck: Normal range of motion. Neck supple. No tracheal deviation present.   Cardiovascular: Normal rate, regular rhythm and normal heart sounds.  Exam reveals no gallop and no friction rub.    No murmur heard.  Pulmonary/Chest: Effort normal and breath sounds normal. No respiratory distress. She has no wheezes. She has no rales. She exhibits no tenderness.   Abdominal: Soft. Bowel sounds are normal. She exhibits no distension. There is no hepatosplenomegaly. There is no tenderness. There is no rigidity, no " rebound and no guarding.   Musculoskeletal: Normal range of motion. She exhibits no edema, tenderness or deformity.   Neurological: She is alert and oriented to person, place, and time. She has normal reflexes.   Skin: Skin is warm and dry. No rash noted. No pallor.   Psychiatric: She has a normal mood and affect. Her behavior is normal. Judgment and thought content normal.       ASSESSMENT AND PLAN    Adrianne was seen today for gi problem.    Diagnoses and all orders for this visit:    Crohn's disease of small intestine without complication (CMS/HCC)    cont ongoing active therapy. Humira to start October 3rd. She is aware of loading dose. She is moving to the [facility] area and she is aware to find a PCP and Gastroenterologist there.       There are no Patient Instructions on file for this visit.  JENNIFER Lezama  2:22 PM  9/24/2018      I performed a history, physical examination, reviewed the progress note/consult note, and discussed the management of this patient with JENNIFER Resendiz as her supervising physician.  I agree with the documented findings and plan of care as outlined with any exceptions or new recommendations noted below.    Patient advised that she needs to locate a gastroenterologist in [place] after she leaves here.  The available see her as needed.  Will start Humira.  She will come by the office for instruction if she is uncomfortable with the new injectors.    EMR Dragon/transcription disclaimer: Much of this encounter note is an electronic transcription/translation of spoken language to printed text.  The electronic translation of spoken language may permit erroneous, or at times, nonsensical words or phrases to be inadvertently transcribed.  Although I have reviewed the note for such errors, some may still exist.      Venancio Viveros MD  2:23 PM  9/24/2018    Obesity, Adult  Obesity is the condition of having too much total body fat. Being overweight or obese means that your  weight is greater than what is considered healthy for your body size. Obesity is determined by a measurement called BMI. BMI is an estimate of body fat and is calculated from height and weight. For adults, a BMI of 30 or higher is considered obese.  Obesity can eventually lead to other health concerns and major illnesses, including:  · Stroke.  · Coronary artery disease (CAD).  · Type 2 diabetes.  · Some types of cancer, including cancers of the colon, breast, uterus, and gallbladder.  · Osteoarthritis.  · High blood pressure (hypertension).  · High cholesterol.  · Sleep apnea.  · Gallbladder stones.  · Infertility problems.  What are the causes?  The main cause of obesity is taking in (consuming) more calories than your body uses for energy. Other factors that contribute to this condition may include:  · Being born with genes that make you more likely to become obese.  · Having a medical condition that causes obesity. These conditions include:  ¨ Hypothyroidism.  ¨ Polycystic ovarian syndrome (PCOS).  ¨ Binge-eating disorder.  ¨ Cushing syndrome.  · Taking certain medicines, such as steroids, antidepressants, and seizure medicines.  · Not being physically active (sedentary lifestyle).  · Living where there are limited places to exercise safely or buy healthy foods.  · Not getting enough sleep.  What increases the risk?  The following factors may increase your risk of this condition:  · Having a family history of obesity.  · Being a woman of -American descent.  · Being a man of  descent.  What are the signs or symptoms?  Having excessive body fat is the main symptom of this condition.  How is this diagnosed?  This condition may be diagnosed based on:  · Your symptoms.  · Your medical history.  · A physical exam. Your health care provider may measure:  ¨ Your BMI. If you are an adult with a BMI between 25 and less than 30, you are considered overweight. If you are an adult with a BMI of 30 or higher,  you are considered obese.  ¨ The distances around your hips and your waist (circumferences). These may be compared to each other to help diagnose your condition.  ¨ Your skinfold thickness. Your health care provider may gently pinch a fold of your skin and measure it.  How is this treated?  Treatment for this condition often includes changing your lifestyle. Treatment may include some or all of the following:  · Dietary changes. Work with your health care provider and a dietitian to set a weight-loss goal that is healthy and reasonable for you. Dietary changes may include eating:  ¨ Smaller portions. A portion size is the amount of a particular food that is healthy for you to eat at one time. This varies from person to person.  ¨ Low-calorie or low-fat options.  ¨ More whole grains, fruits, and vegetables.  · Regular physical activity. This may include aerobic activity (cardio) and strength training.  · Medicine to help you lose weight. Your health care provider may prescribe medicine if you are unable to lose 1 pound a week after 6 weeks of eating more healthily and doing more physical activity.  · Surgery. Surgical options may include gastric banding and gastric bypass. Surgery may be done if:  ¨ Other treatments have not helped to improve your condition.  ¨ You have a BMI of 40 or higher.  ¨ You have life-threatening health problems related to obesity.  Follow these instructions at home:     Eating and drinking     · Follow recommendations from your health care provider about what you eat and drink. Your health care provider may advise you to:  ¨ Limit fast foods, sweets, and processed snack foods.  ¨ Choose low-fat options, such as low-fat milk instead of whole milk.  ¨ Eat 5 or more servings of fruits or vegetables every day.  ¨ Eat at home more often. This gives you more control over what you eat.  ¨ Choose healthy foods when you eat out.  ¨ Learn what a healthy portion size is.  ¨ Keep low-fat snacks on  hand.  ¨ Avoid sugary drinks, such as soda, fruit juice, iced tea sweetened with sugar, and flavored milk.  ¨ Eat a healthy breakfast.  · Drink enough water to keep your urine clear or pale yellow.  · Do not go without eating for long periods of time (do not fast) or follow a fad diet. Fasting and fad diets can be unhealthy and even dangerous.  Physical Activity   · Exercise regularly, as told by your health care provider. Ask your health care provider what types of exercise are safe for you and how often you should exercise.  · Warm up and stretch before being active.  · Cool down and stretch after being active.  · Rest between periods of activity.  Lifestyle   · Limit the time that you spend in front of your TV, computer, or video game system.  · Find ways to reward yourself that do not involve food.  · Limit alcohol intake to no more than 1 drink a day for nonpregnant women and 2 drinks a day for men. One drink equals 12 oz of beer, 5 oz of wine, or 1½ oz of hard liquor.  General instructions   · Keep a weight loss journal to keep track of the food you eat and how much you exercise you get.  · Take over-the-counter and prescription medicines only as told by your health care provider.  · Take vitamins and supplements only as told by your health care provider.  · Consider joining a support group. Your health care provider may be able to recommend a support group.  · Keep all follow-up visits as told by your health care provider. This is important.  Contact a health care provider if:  · You are unable to meet your weight loss goal after 6 weeks of dietary and lifestyle changes.  This information is not intended to replace advice given to you by your health care provider. Make sure you discuss any questions you have with your health care provider.  Document Released: 01/25/2006 Document Revised: 05/22/2017 Document Reviewed: 10/05/2016  [employer] Interactive Patient Education © 2017 [employer]      IF YOU SMOKE OR  USE TOBACCO PLEASE READ THE FOLLOWING:    Why is smoking bad for me?  Smoking increases the risk of heart disease, lung disease, vascular disease, stroke, and cancer.     If you smoke, STOP!    If you would like more information on quitting smoking, please visit the [employer] website: www.[employer]/healthier-together/smoke   This link will provide additional resources including the QUIT line and the Beat the Pack support groups.     For more information:    Quit Now [facility]  1-800-QUIT-NOW  https://[place].quitlogix.org/en-US/

## (undated) DEVICE — Device: Brand: DEFENDO AIR/WATER/SUCTION AND BIOPSY VALVE

## (undated) DEVICE — TBG SMPL FLTR LINE NASL 02/C02 A/ BX/100

## (undated) DEVICE — ENDOGATOR AUXILIARY WATER JET CONNECTOR: Brand: ENDOGATOR

## (undated) DEVICE — MSK O2 MD CONCENTR A/ LF 7FT 1P/U

## (undated) DEVICE — SENSR O2 OXIMAX FNGR A/ 18IN NONSTR

## (undated) DEVICE — CUFF,BP,DISP,1 TUBE,ADULT,HP: Brand: MEDLINE

## (undated) DEVICE — THE CHANNEL CLEANING BRUSH IS A NYLON FLEXI BRUSH ATTACHED TO A FLEXIBLE PLASTIC SHEATH DESIGNED TO SAFELY REMOVE DEBRIS FROM FLEXIBLE ENDOSCOPES.